# Patient Record
(demographics unavailable — no encounter records)

---

## 2024-10-25 NOTE — HISTORY OF PRESENT ILLNESS
[FreeTextEntry1] : 52 yo P2 LMP Oct 2023 presents for GYN annual visit.   Pt reports vaginal dryness and itching with sexual intercourse. Notes used Estrace and clotrimazole-betamethasone in past which improved her sx with sexual intercourse. Ran out of medication so has not taken it over the past 3 months so sx have returned.  Pt seen by urogyn in March 2024 and was noted to have mixed urinary incontinence, predom urgency. Pt opted for conservative management at that time. Pt notes improvement in sx with conservative management.  Pt reports 3 yr history of bilateral breast discharge - mix of brown and white. Was referred to breast clinic in past but pt denies ever going. Of note, pt had mammo in April 2024 which was BI-RADS 4C. Pt had needle core biopsy of L breast which revealed dense breast and apocrine cyst.   Pt reports sexual activity with one partner only. Notes h/o of partner giving chlamydia in past and while she does not suspect partner to have multiple relations at this time, would like STI testing.  PMH: palpitation, arrhythmia, ?bleeding disorder, GERD, colonic polyps PSH: C/S x2, breast surgery, right oophorectomy (for h/o cyst)  HCM: Pap/HPV NILM/neg in Feb 2024 Colonoscopy earlier this year with polyps removed - due in 2029

## 2024-10-25 NOTE — PLAN
[FreeTextEntry1] : 54 yo P2 LMP Oct 2023 presents for GYN annual visit.   #Vaginal dryness - Pt with improvement in past with Estrace and clotrimazole-betamethasone. Resend medications to pharmacy  #Breast discharge - Pt sp Mammo (BIRADS C) with L breast needle core biopsy April 2024  - Obtain Prolactin, f/u results - Obtained sample of breast discharge - sent for cytology. f/u results - Referred pt to breast clinic - referral provided with number to contact  #STI testing - Obtain full STI testing. G/C collected. F/u results  #HCM - Pap, colonoscopy, and mammo up to date  RTC for annual in 1 year or sooner  D/w attending physician, Dr. Arpit Santizo PGY2

## 2024-10-25 NOTE — PHYSICAL EXAM
[Chaperone Present] : A chaperone was present in the examining room during all aspects of the physical examination [Appropriately responsive] : appropriately responsive [Nipple  Discharge] : nipple discharge [Vulvar Atrophy] : vulvar atrophy [Labia Majora] : normal [Labia Minora] : normal [Atrophy] : atrophy [Normal] : normal [Uterine Adnexae] : normal [FreeTextEntry6] : brown and white nipple discharge expressed bilaterally (spots of discharge) upon palpation, L breast with biopsy scar otherwise bilateral breasts without masses, non tender, no axillary LAD noted

## 2024-12-12 NOTE — PHYSICAL EXAM
[Normal] : uterus [No Bleeding] : there was no active vaginal bleeding [Discharge] : had no discharge [Uterine Adnexae] : were not tender and not enlarged [FreeTextEntry5] : Vag NG NAAT collected

## 2024-12-12 NOTE — HISTORY OF PRESENT ILLNESS
[Approximately ___ (Month)] : the LMP was approximately [unfilled] month(s) ago [Sexually Active] : is sexually active [Monogamous] : is monogamous

## 2025-02-18 NOTE — ASSESSMENT
[FreeTextEntry1] : 53 yo f pmhx of genetic arrhythmia, preeclampsia, and perimenopause, comes in for a follow-up appointment regarding the patient's elevated bp to 200s systolic requiring hospitalization.   HTN Patient symptomatic to 200s The hospital workup up negative  In-person bp 100/60 on propranolol and amlodipine  has recurrent symptoms every 6 months with an apparent family history of similar symptoms as well PE with right flank pain as well Although not one triple therapy of Anti HTN meds will start a secondary HTN workup   Plan: Renin aldosterone ratio Catecholamines  Kidney ultrasound with Doppler  CTM BP with logs  Follow up in 5 weeks   Perimenopause Patient symptomatic on estrogen with recurrence in periods Following gyn and scheduled for pelvic/transvaginal ultrasound   Plan: Offered ssri but not sure if necessary at this point in time continue of estrogen follow up with gyn trans pelvic/ transvaginal ultrasound  Hx of Arrhythmia Unknown exact arrhythmia EKG from hospitalization looks WNL VS and PE WNL  Plan: No cardiologist on file  Referral to cardiology  Continue Propranolol

## 2025-02-18 NOTE — ASSESSMENT
[FreeTextEntry1] : 55 yo f pmhx of genetic arrhythmia, preeclampsia, and perimenopause, comes in for a follow-up appointment regarding the patient's elevated bp to 200s systolic requiring hospitalization.   HTN Patient symptomatic to 200s The hospital workup up negative  In-person bp 100/60 on propranolol and amlodipine  has recurrent symptoms every 6 months with an apparent family history of similar symptoms as well PE with right flank pain as well Although not one triple therapy of Anti HTN meds will start a secondary HTN workup   Plan: Renin aldosterone ratio Catecholamines  Kidney ultrasound with Doppler  CTM BP with logs  Follow up in 5 weeks   Perimenopause Patient symptomatic on estrogen with recurrence in periods Following gyn and scheduled for pelvic/transvaginal ultrasound   Plan: Offered ssri but not sure if necessary at this point in time continue of estrogen follow up with gyn trans pelvic/ transvaginal ultrasound  Hx of Arrhythmia Unknown exact arrhythmia EKG from hospitalization looks WNL VS and PE WNL  Plan: No cardiologist on file  Referral to cardiology  Continue Propranolol

## 2025-02-18 NOTE — PHYSICAL EXAM
[Normal] : affect was normal and insight and judgment were intact [de-identified] : R sided CVA tenderness, no suprapubic tenderness

## 2025-02-18 NOTE — END OF VISIT
[] : Resident [FreeTextEntry3] : 55 yo f pmhx of ? genetic arrhythmia, preeclampsia, and perimenopause, comes in for a follow-up appointment with recent admission for hypertensive urgency requiring hospitalization. Has symptoms of abdominal pain, headache, double vision, tremors with recurrent symptoms every 6 months, c/w propranolol and amlodipine, has recurrent flank pain - kidney US, further w/u as above. Also needs w/u for this reported ?genetic arrhythmia, cardio referral

## 2025-02-18 NOTE — PHYSICAL EXAM
[Normal] : affect was normal and insight and judgment were intact [de-identified] : R sided CVA tenderness, no suprapubic tenderness
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
## 2025-03-21 NOTE — REVIEW OF SYSTEMS
[Palpitations] : palpitations [Headache] : headache [Swollen Glands] : swollen glands [Negative] : Psychiatric [de-identified] : pt c/f unilateral swelling along the neck

## 2025-03-21 NOTE — REVIEW OF SYSTEMS
[Palpitations] : palpitations [Headache] : headache [Swollen Glands] : swollen glands [Negative] : Psychiatric [de-identified] : pt c/f unilateral swelling along the neck

## 2025-03-21 NOTE — HISTORY OF PRESENT ILLNESS
[FreeTextEntry1] : F.u BPs; referrals for specialists [de-identified] : 55 y/o F w pmhx of HTN, asthma, perimenopause presenting for follow-up.  #Paroxysmal Tachycardia #HTN episodes Patient underwent w/u for HTN emergency in ED and was discharged after negative workup this year. 1x 6months has high bp in 200s - has similar symptoms but not this severe Reports such episodes occur 3x week where she suddenly has elevated blood pressures accompanied by tachycardia. She states that her father not only has a history of arrhythmias but similar symptoms as well. Reports such episodes occur 3x week where she suddenly has elevated blood pressures accompanied by tachycardia. Significant relief with PRN propanolol Reports previous work up with holter monitor was unrevealing at OSH -Urine metanephrines, serum aldosterone, serum renin, serum angiotensin II all wnl. -Patient underwent Renal ultrasound today 3/21/25  #Twitching of periorbital muscles x 3months -Visual fields intact, no drooping of eye lid muscles, EOM intact -Neuro referral for upcoming appt. -Optho referral for HCM.

## 2025-03-21 NOTE — HISTORY OF PRESENT ILLNESS
[FreeTextEntry1] : F.u BPs; referrals for specialists [de-identified] : 55 y/o F w pmhx of HTN, asthma, perimenopause presenting for follow-up.  #Paroxysmal Tachycardia #HTN episodes Patient underwent w/u for HTN emergency in ED and was discharged after negative workup this year. 1x 6months has high bp in 200s - has similar symptoms but not this severe Reports such episodes occur 3x week where she suddenly has elevated blood pressures accompanied by tachycardia. She states that her father not only has a history of arrhythmias but similar symptoms as well. Reports such episodes occur 3x week where she suddenly has elevated blood pressures accompanied by tachycardia. Significant relief with PRN propanolol Reports previous work up with holter monitor was unrevealing at OSH -Urine metanephrines, serum aldosterone, serum renin, serum angiotensin II all wnl. -Patient underwent Renal ultrasound today 3/21/25  #Twitching of periorbital muscles x 3months -Visual fields intact, no drooping of eye lid muscles, EOM intact -Neuro referral for upcoming appt. -Optho referral for HCM.

## 2025-03-21 NOTE — PHYSICAL EXAM
[Normal Sclera/Conjunctiva] : normal sclera/conjunctiva [PERRL] : pupils equal round and reactive to light [EOMI] : extraocular movements intact [No JVD] : no jugular venous distention [Normal] : normal rate, regular rhythm, normal S1 and S2 and no murmur heard [Soft] : abdomen soft [Non Tender] : non-tender [Non-distended] : non-distended [Normal Posterior Cervical Nodes] : no posterior cervical lymphadenopathy [Normal Anterior Cervical Nodes] : no anterior cervical lymphadenopathy [Grossly Normal Strength/Tone] : grossly normal strength/tone [No Focal Deficits] : no focal deficits [Normal Affect] : the affect was normal [Normal Insight/Judgement] : insight and judgment were intact [de-identified] : Subtle, questionable unilateral R sided lymphadenopathy. Non erythemetaous, no mass appreciated

## 2025-03-21 NOTE — PHYSICAL EXAM
[Normal Sclera/Conjunctiva] : normal sclera/conjunctiva [PERRL] : pupils equal round and reactive to light [EOMI] : extraocular movements intact [No JVD] : no jugular venous distention [Normal] : normal rate, regular rhythm, normal S1 and S2 and no murmur heard [Soft] : abdomen soft [Non Tender] : non-tender [Non-distended] : non-distended [Normal Posterior Cervical Nodes] : no posterior cervical lymphadenopathy [Normal Anterior Cervical Nodes] : no anterior cervical lymphadenopathy [Grossly Normal Strength/Tone] : grossly normal strength/tone [No Focal Deficits] : no focal deficits [Normal Affect] : the affect was normal [Normal Insight/Judgement] : insight and judgment were intact [de-identified] : Subtle, questionable unilateral R sided lymphadenopathy. Non erythemetaous, no mass appreciated

## 2025-03-21 NOTE — ASSESSMENT
[FreeTextEntry1] : Case seen and discussed with Dr. Henley Return to clinic in 6 months/ALEX Flanagan, PGY-1
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
## 2025-03-28 NOTE — REVIEW OF SYSTEMS
[Fever] : no fever [Chills] : no chills [Facial Weakness] : no facial weakness [Arm Weakness] : no arm weakness [Hand Weakness] : no hand weakness [Leg Weakness] : no leg weakness [Numbness] : no numbness [Tingling] : no tingling
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
## 2025-03-28 NOTE — PHYSICAL EXAM
[Person] : oriented to person [Place] : oriented to place [Time] : oriented to time [Cranial Nerves Optic (II)] : visual acuity intact bilaterally,  visual fields full to confrontation, pupils equal round and reactive to light [Cranial Nerves Oculomotor (III)] : extraocular motion intact [Cranial Nerves Trigeminal (V)] : facial sensation intact symmetrically [Cranial Nerves Facial (VII)] : face symmetrical [Cranial Nerves Glossopharyngeal (IX)] : tongue and palate midline [Cranial Nerves Accessory (XI - Cranial And Spinal)] : head turning and shoulder shrug symmetric [Cranial Nerves Hypoglossal (XII)] : there was no tongue deviation with protrusion [Motor Tone] : muscle tone was normal in all four extremities [Motor Strength] : muscle strength was normal in all four extremities [Sensation Tactile Decrease] : light touch was intact [Abnormal Walk] : normal gait [2+] : Ankle jerk left 2+ [Motor Strength Upper Extremities Bilaterally] : strength was normal in both upper extremities [Motor Strength Lower Extremities Bilaterally] : strength was normal in both lower extremities [Plantar Reflex Right Only] : normal on the right [Plantar Reflex Left Only] : normal on the left

## 2025-04-22 NOTE — END OF VISIT
[] : Resident [FreeTextEntry3] : Agree w resident note.  Ana jackson pt.  Discussed need for endometrial sampling to R/O malignancy,  Pt expressed understanding but declines office bx and wants to proceed w procedure under anesthesia,  Plan for D&C< hysteroscopy.  Discussed risks of procedure including but not limited to risks of general anesthesia, bleeding, infection and uterine perforation.  All questions answered.  Travis proceed.

## 2025-04-22 NOTE — HISTORY OF PRESENT ILLNESS
[FreeTextEntry1] : 55 yo  here for follow up after episode of vaginal bleeding in November after not having menses for over a year. Pt obtained TVUS after last visit with results below. Pt declines any complaints at this time. Denies any bleeding since November, denies abd pain, n/v, bloating.  TVUS (3/2025):  FINDINGS: Uterus: 8.3 cm x 4.1 cm x 5.5 cm. 1.9 cm left anterior intramural fibroid. Endometrium: 7 mm. Thickened Right ovary: Removed. Left ovary: 2.9 cm x 2.6 cm x 1.9 cm. Within normal limits.  ObHx: C/s x2, TOP w D&C x2, MAB GynHx: R oophorectomy for hx of cyst, otherwise denies PMSH: oophorectomy, colon polyps, "breast surgeries for discharge", HTN, palpitations (follows specialist) Med: Amlodipine, Propranolol PRN  All: seasonal

## 2025-04-22 NOTE — HISTORY OF PRESENT ILLNESS
[FreeTextEntry1] : 53 yo  here for follow up after episode of vaginal bleeding in November after not having menses for over a year. Pt obtained TVUS after last visit with results below. Pt declines any complaints at this time. Denies any bleeding since November, denies abd pain, n/v, bloating.  TVUS (3/2025):  FINDINGS: Uterus: 8.3 cm x 4.1 cm x 5.5 cm. 1.9 cm left anterior intramural fibroid. Endometrium: 7 mm. Thickened Right ovary: Removed. Left ovary: 2.9 cm x 2.6 cm x 1.9 cm. Within normal limits.  ObHx: C/s x2, TOP w D&C x2, MAB GynHx: R oophorectomy for hx of cyst, otherwise denies PMSH: oophorectomy, colon polyps, "breast surgeries for discharge", HTN, palpitations (follows specialist) Med: Amlodipine, Propranolol PRN  All: seasonal

## 2025-04-22 NOTE — PHYSICAL EXAM
[Appropriately responsive] : appropriately responsive [Alert] : alert [Oriented x3] : oriented x3 [FreeTextEntry3] : Grossly normal [FreeTextEntry5] : Breathing comfortably on room air

## 2025-04-22 NOTE — PLAN
[FreeTextEntry1] : 53 yo  here for follow up for postmenopausal bleeding. TVUS revealed EM lining of 7mm. Pt noted poor hx with in office procedures, would like to be scheduled outpt for a biopsy. Pt to be scheduled for a D&C hysteroscopy with EMB at this time.   Linus Daniel PGY1

## 2025-04-22 NOTE — REVIEW OF SYSTEMS
[Fever] : no fever [Chills] : no chills [Fatigue] : no fatigue [Eye Discharge] : no eye discharge [Dyspnea] : no dyspnea [Chest Pain] : no chest pain [Abdominal Pain] : no abdominal pain [Urgency] : no urgency

## 2025-06-13 NOTE — REVIEW OF SYSTEMS
[Insomnia] : insomnia [Negative] : Gastrointestinal [Suicidal] : not suicidal [FreeTextEntry4] : see HPI [de-identified] : see HPI

## 2025-06-13 NOTE — ASSESSMENT
[FreeTextEntry1] : 55 y/o F w pmhx of HTN, asthma, perimenopause came for ringing in the ear.  Tinnitus - bilateral, Right> Left for 2 months - Normal ear exam. No evidence of acute otitis media or externa.  - Refer to ENT for further evaluation.   Perimenopause/Grieving - Denied SI/HI. reports difficulty sleeping and poor concentration during the day after her father passed away.  - Will try Desvenlafaxine 50mg. Return in 4-6wks for f/u.   HTN - On amlodipine 10mg, and propranolol 10mg prn for palpitation.   - Explained to patient that amlodipine is more effective at controlling BP. Gordo and dizziness likely r/t doubling dosage of propranolol.  - Continue to take amlodipine 10mg as prescribed.   Return in 4-6wks for f/u perimenopausal symptoms and grieving.    30 minutes of total time was spent on the date of the encounter. This included time for review of medical records and laboratory results, face to face time (including the physical examination counseling and coordination of care), time for patient education, treatment plans, answering questions, and communicating with other doctors for medical record documentation.

## 2025-06-13 NOTE — PHYSICAL EXAM
[No Acute Distress] : no acute distress [Well-Appearing] : well-appearing [Normal Voice/Communication] : normal voice/communication [Normal Sclera/Conjunctiva] : normal sclera/conjunctiva [PERRL] : pupils equal round and reactive to light [EOMI] : extraocular movements intact [Normal Outer Ear/Nose] : the outer ears and nose were normal in appearance [Normal Oropharynx] : the oropharynx was normal [Normal TMs] : both tympanic membranes were normal [No Respiratory Distress] : no respiratory distress  [No Accessory Muscle Use] : no accessory muscle use [Clear to Auscultation] : lungs were clear to auscultation bilaterally [Normal Rate] : normal rate  [Regular Rhythm] : with a regular rhythm [Normal S1, S2] : normal S1 and S2 [Normal Affect] : the affect was normal [Alert and Oriented x3] : oriented to person, place, and time [Normal Mood] : the mood was normal [Normal Insight/Judgement] : insight and judgment were intact

## 2025-06-13 NOTE — REVIEW OF SYSTEMS
[Insomnia] : insomnia [Negative] : Gastrointestinal [Suicidal] : not suicidal [FreeTextEntry4] : see HPI [de-identified] : see HPI

## 2025-06-13 NOTE — ASSESSMENT
[FreeTextEntry1] : 53 y/o F w pmhx of HTN, asthma, perimenopause came for ringing in the ear.  Tinnitus - bilateral, Right> Left for 2 months - Normal ear exam. No evidence of acute otitis media or externa.  - Refer to ENT for further evaluation.   Perimenopause/Grieving - Denied SI/HI. reports difficulty sleeping and poor concentration during the day after her father passed away.  - Will try Desvenlafaxine 50mg. Return in 4-6wks for f/u.   HTN - On amlodipine 10mg, and propranolol 10mg prn for palpitation.   - Explained to patient that amlodipine is more effective at controlling BP. [place] and dizziness likely r/t doubling dosage of propranolol.  - Continue to take amlodipine 10mg as prescribed.   Return in 4-6wks for f/u perimenopausal symptoms and grieving.    30 minutes of total time was spent on the date of the encounter. This included time for review of medical records and laboratory results, face to face time (including the physical examination counseling and coordination of care), time for patient education, treatment plans, answering questions, and communicating with other doctors for medical record documentation.

## 2025-06-13 NOTE — HISTORY OF PRESENT ILLNESS
[FreeTextEntry8] : 55 y/o F w pmhx of HTN, asthma, perimenopause came for ringing in the ear.    She has b/l ringing in the ear x 2months, Rt> Lt, and she noted it is more at night time. She wants to make sure there is no ear infection. She denied recent head/ear trauma, exposure to low noise, fever, chill, ear drainage.   Periorbital Muscle twitching: Neuro increased propranolol to 20mg. She experiences bradycardia and occasional dizziness after taking 20mg. So, she stopped 20mg and took 10mg instead. She is also taking amlodipine for HTN, and she wants to know if amlodipine can be discontinued and control her HTN with only propranolol.   Perimenopause/grieving: Her father passed way suddenly about a month ago from sepsis in the home [place]. She reports insomnia and difficulty concentrating during the day, in addition to hot flashes at night. She denied SI/HI.
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
## 2025-07-03 NOTE — DATA REVIEWED
[FreeTextEntry1] : BREAST PATH/RAD REVIEW.  [facility]. 4/9/24 BL Dx MG/US: birads 4C. HD.  - New L upper 7 mm asymmetry likely c/t 12N3 0.8 cm mixed solid and cystic mass on US. (Rec US guided bx)  - L 2N7 0.5 cm irregular shadowing mass. (Rec US guided bx)  - L 4N4 0.9 cm cyst.   4/26/24 2-site Left US guided core biopsy:  1. Left [12N3] core bx: Apocrine cysts. [ribbon]. Benign and concordant.  2. Left [2N7] core bx: Benign fibrous breast tissue. [heart]. Benign and concordant. (Rec 1 year f/u L Dx US, clinical f/u for brown nipple discharge - can consider breast MRI).   6/27/25 BL SC MG/US (TC 5.9%): birads 3. SFGD.  - New R 4:00-RA 0.4 cm probable complicated cyst.  - L breast w/ a few cysts up to 6 mm. New L 9:00-RA 0.4 cm septated cyst. New L 12N2 0.6 cm prob septated cyst.  - L 12N3 bx site w/o associated mass. Stable L 2N7 0.6 cm hypoechoic shadowing area. (Rec BL Dx US).

## 2025-07-03 NOTE — CONSULT LETTER
[Dear  ___] : Dear  [unfilled], [Consult Letter:] : I had the pleasure of evaluating your patient, [unfilled]. [Please see my note below.] : Please see my note below. [Consult Closing:] : Thank you very much for allowing me to participate in the care of this patient.  If you have any questions, please do not hesitate to contact me. [Sincerely,] : Sincerely, [FreeTextEntry3] : Sylvia A. Reyes, M.D., MBS, FACS Breast Surgery Division of Surgical Oncology [address] [phone]

## 2025-07-03 NOTE — HISTORY OF PRESENT ILLNESS
[FreeTextEntry1] : MALA FULLER is a 55 y/o F who presents for evaluation of nipple discharge.  Referred by: Breast .  PCP: Dr. Flores Salazar.   2017- Reported chronic B/L milky nipple discharge.  2023 Reported brownish nipple discharge   PMHx:  PSHx: Left breast excision ? Meds:  NKDA Family Hx: GYN: GXPX, menarche age XX, LMP XX. Age at first pregnancy XX.  x . h/o hormone replacement XX. Not/Is on hormone replacement currently. Bra size: Occupation:  Social: Smoking:         ETOH:

## 2025-07-03 NOTE — ASSESSMENT
[FreeTextEntry1] : MALA FULLER is a 53 y/o F who presents for evaluation of nipple discharge.   Next imaging:  BL Dx US due 12/2025 (birads 3)  RTO ---. She knows to return sooner if any breast abnormalities or if any breast issues/concerns arise